# Patient Record
Sex: MALE | URBAN - METROPOLITAN AREA
[De-identification: names, ages, dates, MRNs, and addresses within clinical notes are randomized per-mention and may not be internally consistent; named-entity substitution may affect disease eponyms.]

---

## 2019-11-27 PROBLEM — Z00.00 ENCOUNTER FOR PREVENTIVE HEALTH EXAMINATION: Status: ACTIVE | Noted: 2019-11-27

## 2020-03-24 ENCOUNTER — INPATIENT (INPATIENT)
Facility: HOSPITAL | Age: 50
LOS: 5 days | Discharge: HOME | DRG: 193 | End: 2020-03-30
Attending: STUDENT IN AN ORGANIZED HEALTH CARE EDUCATION/TRAINING PROGRAM | Admitting: INTERNAL MEDICINE
Payer: SELF-PAY

## 2020-03-24 VITALS
HEART RATE: 113 BPM | WEIGHT: 250 LBS | TEMPERATURE: 100 F | OXYGEN SATURATION: 97 % | DIASTOLIC BLOOD PRESSURE: 67 MMHG | SYSTOLIC BLOOD PRESSURE: 118 MMHG | HEIGHT: 73 IN | RESPIRATION RATE: 20 BRPM

## 2020-03-24 DIAGNOSIS — R68.89 OTHER GENERAL SYMPTOMS AND SIGNS: ICD-10-CM

## 2020-03-24 LAB
ALBUMIN SERPL ELPH-MCNC: 3.5 G/DL — SIGNIFICANT CHANGE UP (ref 3.3–5)
ALP SERPL-CCNC: 65 U/L — SIGNIFICANT CHANGE UP (ref 40–120)
ALT FLD-CCNC: 27 U/L — SIGNIFICANT CHANGE UP (ref 10–45)
ANION GAP SERPL CALC-SCNC: 15 MMOL/L — SIGNIFICANT CHANGE UP (ref 5–17)
APTT BLD: 27.9 SEC — SIGNIFICANT CHANGE UP (ref 27.5–36.3)
AST SERPL-CCNC: 33 U/L — SIGNIFICANT CHANGE UP (ref 10–40)
BASOPHILS # BLD AUTO: 0.02 K/UL — SIGNIFICANT CHANGE UP (ref 0–0.2)
BASOPHILS NFR BLD AUTO: 0.3 % — SIGNIFICANT CHANGE UP (ref 0–2)
BILIRUB SERPL-MCNC: 0.2 MG/DL — SIGNIFICANT CHANGE UP (ref 0.2–1.2)
BUN SERPL-MCNC: 18 MG/DL — SIGNIFICANT CHANGE UP (ref 7–23)
CALCIUM SERPL-MCNC: 8.6 MG/DL — SIGNIFICANT CHANGE UP (ref 8.4–10.5)
CHLORIDE SERPL-SCNC: 98 MMOL/L — SIGNIFICANT CHANGE UP (ref 96–108)
CO2 SERPL-SCNC: 20 MMOL/L — LOW (ref 22–31)
CREAT SERPL-MCNC: 0.75 MG/DL — SIGNIFICANT CHANGE UP (ref 0.5–1.3)
EOSINOPHIL # BLD AUTO: 0.15 K/UL — SIGNIFICANT CHANGE UP (ref 0–0.5)
EOSINOPHIL NFR BLD AUTO: 2.4 % — SIGNIFICANT CHANGE UP (ref 0–6)
GLUCOSE SERPL-MCNC: 120 MG/DL — HIGH (ref 70–99)
HCT VFR BLD CALC: 44.7 % — SIGNIFICANT CHANGE UP (ref 39–50)
HGB BLD-MCNC: 14.3 G/DL — SIGNIFICANT CHANGE UP (ref 13–17)
IMM GRANULOCYTES NFR BLD AUTO: 0.5 % — SIGNIFICANT CHANGE UP (ref 0–1.5)
INR BLD: 1.06 RATIO — SIGNIFICANT CHANGE UP (ref 0.88–1.16)
LACTATE BLDV-MCNC: 1.5 MMOL/L — SIGNIFICANT CHANGE UP (ref 0.7–2)
LYMPHOCYTES # BLD AUTO: 1.46 K/UL — SIGNIFICANT CHANGE UP (ref 1–3.3)
LYMPHOCYTES # BLD AUTO: 23.3 % — SIGNIFICANT CHANGE UP (ref 13–44)
MCHC RBC-ENTMCNC: 28.6 PG — SIGNIFICANT CHANGE UP (ref 27–34)
MCHC RBC-ENTMCNC: 32 GM/DL — SIGNIFICANT CHANGE UP (ref 32–36)
MCV RBC AUTO: 89.4 FL — SIGNIFICANT CHANGE UP (ref 80–100)
MONOCYTES # BLD AUTO: 0.41 K/UL — SIGNIFICANT CHANGE UP (ref 0–0.9)
MONOCYTES NFR BLD AUTO: 6.5 % — SIGNIFICANT CHANGE UP (ref 2–14)
NEUTROPHILS # BLD AUTO: 4.19 K/UL — SIGNIFICANT CHANGE UP (ref 1.8–7.4)
NEUTROPHILS NFR BLD AUTO: 67 % — SIGNIFICANT CHANGE UP (ref 43–77)
NRBC # BLD: 0 /100 WBCS — SIGNIFICANT CHANGE UP (ref 0–0)
PLATELET # BLD AUTO: 196 K/UL — SIGNIFICANT CHANGE UP (ref 150–400)
POTASSIUM SERPL-MCNC: 4.7 MMOL/L — SIGNIFICANT CHANGE UP (ref 3.5–5.3)
POTASSIUM SERPL-SCNC: 4.7 MMOL/L — SIGNIFICANT CHANGE UP (ref 3.5–5.3)
PROT SERPL-MCNC: 7.2 G/DL — SIGNIFICANT CHANGE UP (ref 6–8.3)
PROTHROM AB SERPL-ACNC: 12.2 SEC — SIGNIFICANT CHANGE UP (ref 10–12.9)
RBC # BLD: 5 M/UL — SIGNIFICANT CHANGE UP (ref 4.2–5.8)
RBC # FLD: 13.1 % — SIGNIFICANT CHANGE UP (ref 10.3–14.5)
SODIUM SERPL-SCNC: 133 MMOL/L — LOW (ref 135–145)
WBC # BLD: 6.26 K/UL — SIGNIFICANT CHANGE UP (ref 3.8–10.5)
WBC # FLD AUTO: 6.26 K/UL — SIGNIFICANT CHANGE UP (ref 3.8–10.5)

## 2020-03-24 PROCEDURE — 99285 EMERGENCY DEPT VISIT HI MDM: CPT

## 2020-03-24 PROCEDURE — 71045 X-RAY EXAM CHEST 1 VIEW: CPT | Mod: 26

## 2020-03-24 PROCEDURE — 99223 1ST HOSP IP/OBS HIGH 75: CPT

## 2020-03-24 RX ORDER — SODIUM CHLORIDE 9 MG/ML
1000 INJECTION INTRAMUSCULAR; INTRAVENOUS; SUBCUTANEOUS ONCE
Refills: 0 | Status: COMPLETED | OUTPATIENT
Start: 2020-03-24 | End: 2020-03-24

## 2020-03-24 RX ORDER — ACETAMINOPHEN 500 MG
975 TABLET ORAL ONCE
Refills: 0 | Status: COMPLETED | OUTPATIENT
Start: 2020-03-24 | End: 2020-03-24

## 2020-03-24 RX ADMIN — SODIUM CHLORIDE 1000 MILLILITER(S): 9 INJECTION INTRAMUSCULAR; INTRAVENOUS; SUBCUTANEOUS at 22:06

## 2020-03-24 RX ADMIN — Medication 975 MILLIGRAM(S): at 22:05

## 2020-03-24 NOTE — ED ADULT NURSE NOTE - OBJECTIVE STATEMENT
49 year old A&Ox3 male presents to ED complaining of 49 year old A&Ox3 male presents to ED complaining of cough and fever x 5 days. PMH SAH. +SOB at rest, ERICKSON, mild labored breathing, placed on 3L NC with improvement to 97%, faint rales heard in bilateral bases. Pt febrile and tachycardic upon arrival to [facility]. Akbar Huang aware. Denies CP,  n/v/d, abdominal pain, urinary symptoms,  numbness, tingling in upper and lower extremities, HA, blurry vision. Updated on plan of care.

## 2020-03-24 NOTE — ED PROVIDER NOTE - ATTENDING CONTRIBUTION TO CARE
Attending MD Jimenez:   I personally have seen and examined this patient.  Physician assistant note reviewed and agree on plan of care and except where noted.  See below for details.     Seen in [id number]    49M with no reported PMH/PSH presents to the ED with     TO BE COMPLETED Attending MD Jimenez:   I personally have seen and examined this patient.  Physician assistant note reviewed and agree on plan of care and except where noted.  See below for details.     Seen in [id number]    49M with PMD of SAH about 10 yrs ago, reports no aneurysm presents to the ED with cough, fever.  Reports symptoms started on Friday, that day oral Tmax was 102.  Reports that he felt his head spinning, reports almost fell down.  Reports had "typical flu crap".  Reports had a dry cough, reports took Mucinex but denies expectorating.  Reports shortness of breath, worse with laying down, improved with sitting up and not speaking.  Denies chest pain.  Denies hemoptysis, denies travel, denies history of clots.  Denies known covid contacts.  Reports initially had mild sore throat that has now resolved.  Denies abdominal pain, nausea, vomiting, diarrhea, blood in stools. Denies dysuria, hematuria, change in urinary habits including frequency, urgency. Denies meds, denies allergies. On exam, head NCAT, PERRL, FROM at neck, no tenderness to midline palpation, no stepoffs along length of spine, lungs CTAB with good inspiratory effort, no wheezing, no rhonchi, no rales, Sat'ing in low 90s with speaking with visible increased work of breathing, no retractions, no accessory muscle use, +S1S2, tachy, no m/r/g, abdomen soft with +BS, NT, ND, moving all extremities with 5/5 strength bilateral upper and lower extremities, good and equal  strength bilaterally, no calf tenderness, swelling, erythema or warmth; A/P: 49M with cough, fever, Ddx includes influenza, URI, possibly COVID-19, PNA, will obtain labs, swab, Cx, placed on O2 in the room, improved to mid 90s on 3L, still drops to 92% with speaking, will need admission for hypoxia

## 2020-03-24 NOTE — ED PROVIDER NOTE - PHYSICAL EXAMINATION
GEN: Pt in NAD, A&O x3.  PSYCH: Affect appropriate.  EYES: Sclera white w/o injection.   ENT: Head NCAT. Nose w/o deformity. No auricular TTP. MMM, uvula midline, no trismus, no dysphonia, no drooling. Neck supple FROM.   RESP: Comfortable on nasal cannula, SpO2 91-92% on RA at rest with mild dyspnea, inspiratory rales b/l more prominent in middle and upper lung fields. no wheezing or rhonchi.   CARDIAC: RRR, clear distinct S1, S2, no appreciable murmurs.  ABD: Abdomen soft, non-tender. No CVAT b/l.  VASC: 2+ radial and dorsalis pedis pulses b/l. No edema or calf tenderness.  SKIN: No rashes on the trunk.

## 2020-03-24 NOTE — ED ADULT NURSE NOTE - NSIMPLEMENTINTERV_GEN_ALL_ED
Implemented All Universal Safety Interventions:  [place] to call system. Call bell, personal items and telephone within reach. Instruct patient to call for assistance. Room bathroom lighting operational. Non-slip footwear when patient is off stretcher. Physically safe environment: no spills, clutter or unnecessary equipment. Stretcher in lowest position, wheels locked, appropriate side rails in place.

## 2020-03-24 NOTE — ED PROVIDER NOTE - PROGRESS NOTE DETAILS
This patient is a non [facility] employee and seen in the Emergency department.   Staff did use appropriate PPE.

## 2020-03-25 DIAGNOSIS — J96.01 ACUTE RESPIRATORY FAILURE WITH HYPOXIA: ICD-10-CM

## 2020-03-25 DIAGNOSIS — R09.89 OTHER SPECIFIED SYMPTOMS AND SIGNS INVOLVING THE CIRCULATORY AND RESPIRATORY SYSTEMS: ICD-10-CM

## 2020-03-25 DIAGNOSIS — R68.89 OTHER GENERAL SYMPTOMS AND SIGNS: ICD-10-CM

## 2020-03-25 DIAGNOSIS — I60.9 NONTRAUMATIC SUBARACHNOID HEMORRHAGE, UNSPECIFIED: ICD-10-CM

## 2020-03-25 DIAGNOSIS — Z29.9 ENCOUNTER FOR PROPHYLACTIC MEASURES, UNSPECIFIED: ICD-10-CM

## 2020-03-25 LAB
ANION GAP SERPL CALC-SCNC: 13 MMOL/L — SIGNIFICANT CHANGE UP (ref 5–17)
APPEARANCE UR: CLEAR — SIGNIFICANT CHANGE UP
APTT BLD: 28.2 SEC — SIGNIFICANT CHANGE UP (ref 27.5–36.3)
BASOPHILS # BLD AUTO: 0.02 K/UL — SIGNIFICANT CHANGE UP (ref 0–0.2)
BASOPHILS NFR BLD AUTO: 0.4 % — SIGNIFICANT CHANGE UP (ref 0–2)
BILIRUB UR-MCNC: NEGATIVE — SIGNIFICANT CHANGE UP
BUN SERPL-MCNC: 12 MG/DL — SIGNIFICANT CHANGE UP (ref 7–23)
CALCIUM SERPL-MCNC: 8.8 MG/DL — SIGNIFICANT CHANGE UP (ref 8.4–10.5)
CHLORIDE SERPL-SCNC: 100 MMOL/L — SIGNIFICANT CHANGE UP (ref 96–108)
CO2 SERPL-SCNC: 23 MMOL/L — SIGNIFICANT CHANGE UP (ref 22–31)
COLOR SPEC: SIGNIFICANT CHANGE UP
CREAT SERPL-MCNC: 0.75 MG/DL — SIGNIFICANT CHANGE UP (ref 0.5–1.3)
CRP SERPL-MCNC: 8.55 MG/DL — HIGH (ref 0–0.4)
CULTURE RESULTS: NO GROWTH — SIGNIFICANT CHANGE UP
D DIMER BLD IA.RAPID-MCNC: 431 NG/ML DDU — HIGH
DIFF PNL FLD: NEGATIVE — SIGNIFICANT CHANGE UP
EOSINOPHIL # BLD AUTO: 0 K/UL — SIGNIFICANT CHANGE UP (ref 0–0.5)
EOSINOPHIL NFR BLD AUTO: 0 % — SIGNIFICANT CHANGE UP (ref 0–6)
FERRITIN SERPL-MCNC: 680 NG/ML — HIGH (ref 30–400)
GLUCOSE SERPL-MCNC: 108 MG/DL — HIGH (ref 70–99)
GLUCOSE UR QL: NEGATIVE — SIGNIFICANT CHANGE UP
HCT VFR BLD CALC: 44.7 % — SIGNIFICANT CHANGE UP (ref 39–50)
HGB BLD-MCNC: 14 G/DL — SIGNIFICANT CHANGE UP (ref 13–17)
IMM GRANULOCYTES NFR BLD AUTO: 0.4 % — SIGNIFICANT CHANGE UP (ref 0–1.5)
INR BLD: 1.01 RATIO — SIGNIFICANT CHANGE UP (ref 0.88–1.16)
KETONES UR-MCNC: NEGATIVE — SIGNIFICANT CHANGE UP
LEUKOCYTE ESTERASE UR-ACNC: NEGATIVE — SIGNIFICANT CHANGE UP
LYMPHOCYTES # BLD AUTO: 1.84 K/UL — SIGNIFICANT CHANGE UP (ref 1–3.3)
LYMPHOCYTES # BLD AUTO: 34.3 % — SIGNIFICANT CHANGE UP (ref 13–44)
MAGNESIUM SERPL-MCNC: 2 MG/DL — SIGNIFICANT CHANGE UP (ref 1.6–2.6)
MCHC RBC-ENTMCNC: 28.3 PG — SIGNIFICANT CHANGE UP (ref 27–34)
MCHC RBC-ENTMCNC: 31.3 GM/DL — LOW (ref 32–36)
MCV RBC AUTO: 90.3 FL — SIGNIFICANT CHANGE UP (ref 80–100)
MONOCYTES # BLD AUTO: 0.4 K/UL — SIGNIFICANT CHANGE UP (ref 0–0.9)
MONOCYTES NFR BLD AUTO: 7.5 % — SIGNIFICANT CHANGE UP (ref 2–14)
NEUTROPHILS # BLD AUTO: 3.08 K/UL — SIGNIFICANT CHANGE UP (ref 1.8–7.4)
NEUTROPHILS NFR BLD AUTO: 57.4 % — SIGNIFICANT CHANGE UP (ref 43–77)
NITRITE UR-MCNC: NEGATIVE — SIGNIFICANT CHANGE UP
NRBC # BLD: 0 /100 WBCS — SIGNIFICANT CHANGE UP (ref 0–0)
PH UR: 6.5 — SIGNIFICANT CHANGE UP (ref 5–8)
PLATELET # BLD AUTO: 218 K/UL — SIGNIFICANT CHANGE UP (ref 150–400)
POTASSIUM SERPL-MCNC: 5.1 MMOL/L — SIGNIFICANT CHANGE UP (ref 3.5–5.3)
POTASSIUM SERPL-SCNC: 5.1 MMOL/L — SIGNIFICANT CHANGE UP (ref 3.5–5.3)
PROCALCITONIN SERPL-MCNC: 0.08 NG/ML — SIGNIFICANT CHANGE UP (ref 0.02–0.1)
PROT UR-MCNC: SIGNIFICANT CHANGE UP
PROTHROM AB SERPL-ACNC: 11.6 SEC — SIGNIFICANT CHANGE UP (ref 10–13.1)
RBC # BLD: 4.95 M/UL — SIGNIFICANT CHANGE UP (ref 4.2–5.8)
RBC # FLD: 13.2 % — SIGNIFICANT CHANGE UP (ref 10.3–14.5)
SARS-COV-2 RNA SPEC QL NAA+PROBE: DETECTED
SODIUM SERPL-SCNC: 136 MMOL/L — SIGNIFICANT CHANGE UP (ref 135–145)
SP GR SPEC: 1.02 — SIGNIFICANT CHANGE UP (ref 1.01–1.02)
SPECIMEN SOURCE: SIGNIFICANT CHANGE UP
UROBILINOGEN FLD QL: NEGATIVE — SIGNIFICANT CHANGE UP
WBC # BLD: 5.36 K/UL — SIGNIFICANT CHANGE UP (ref 3.8–10.5)
WBC # FLD AUTO: 5.36 K/UL — SIGNIFICANT CHANGE UP (ref 3.8–10.5)

## 2020-03-25 PROCEDURE — 99233 SBSQ HOSP IP/OBS HIGH 50: CPT

## 2020-03-25 RX ORDER — ALBUTEROL 90 UG/1
2 AEROSOL, METERED ORAL EVERY 6 HOURS
Refills: 0 | Status: DISCONTINUED | OUTPATIENT
Start: 2020-03-25 | End: 2020-03-30

## 2020-03-25 RX ORDER — ACETAMINOPHEN 500 MG
650 TABLET ORAL EVERY 6 HOURS
Refills: 0 | Status: DISCONTINUED | OUTPATIENT
Start: 2020-03-25 | End: 2020-03-30

## 2020-03-25 RX ORDER — HYDROXYCHLOROQUINE SULFATE 200 MG
200 TABLET ORAL EVERY 12 HOURS
Refills: 0 | Status: COMPLETED | OUTPATIENT
Start: 2020-03-26 | End: 2020-03-29

## 2020-03-25 RX ORDER — METOCLOPRAMIDE HCL 10 MG
10 TABLET ORAL ONCE
Refills: 0 | Status: COMPLETED | OUTPATIENT
Start: 2020-03-25 | End: 2020-03-25

## 2020-03-25 RX ORDER — HYDROXYCHLOROQUINE SULFATE 200 MG
400 TABLET ORAL EVERY 12 HOURS
Refills: 0 | Status: COMPLETED | OUTPATIENT
Start: 2020-03-25 | End: 2020-03-25

## 2020-03-25 RX ORDER — ENOXAPARIN SODIUM 100 MG/ML
40 INJECTION SUBCUTANEOUS DAILY
Refills: 0 | Status: DISCONTINUED | OUTPATIENT
Start: 2020-03-25 | End: 2020-03-30

## 2020-03-25 RX ORDER — INFLUENZA VIRUS VACCINE 15; 15; 15; 15 UG/.5ML; UG/.5ML; UG/.5ML; UG/.5ML
0.5 SUSPENSION INTRAMUSCULAR ONCE
Refills: 0 | Status: DISCONTINUED | OUTPATIENT
Start: 2020-03-25 | End: 2020-03-30

## 2020-03-25 RX ORDER — HYDROXYCHLOROQUINE SULFATE 200 MG
TABLET ORAL
Refills: 0 | Status: COMPLETED | OUTPATIENT
Start: 2020-03-25 | End: 2020-03-29

## 2020-03-25 RX ADMIN — Medication 650 MILLIGRAM(S): at 03:39

## 2020-03-25 RX ADMIN — Medication 650 MILLIGRAM(S): at 12:39

## 2020-03-25 RX ADMIN — ENOXAPARIN SODIUM 40 MILLIGRAM(S): 100 INJECTION SUBCUTANEOUS at 08:00

## 2020-03-25 RX ADMIN — Medication 650 MILLIGRAM(S): at 18:58

## 2020-03-25 RX ADMIN — Medication 10 MILLIGRAM(S): at 08:00

## 2020-03-25 RX ADMIN — ALBUTEROL 2 PUFF(S): 90 AEROSOL, METERED ORAL at 17:05

## 2020-03-25 RX ADMIN — Medication 400 MILLIGRAM(S): at 17:04

## 2020-03-25 RX ADMIN — Medication 650 MILLIGRAM(S): at 21:18

## 2020-03-25 RX ADMIN — Medication 650 MILLIGRAM(S): at 11:52

## 2020-03-25 RX ADMIN — Medication 400 MILLIGRAM(S): at 05:42

## 2020-03-25 NOTE — H&P ADULT - NSHPSOCIALHISTORY_GEN_ALL_CORE
Denies smoking or ETOH Denies smoking or ETOH. lives with wife and daughter. No recent travel. Works as . [employer]

## 2020-03-25 NOTE — H&P ADULT - NSHPPHYSICALEXAM_GEN_ALL_CORE
Vital Signs Last 24 Hrs  T(C): 37.4 (03-25-20 @ 00:09), Max: 39.2 (03-24-20 @ 22:14)  T(F): 99.3 (03-25-20 @ 00:09), Max: 102.6 (03-24-20 @ 22:14)  HR: 92 (03-25-20 @ 00:09) (92 - 113)  BP: 125/88 (03-25-20 @ 00:09) (118/67 - 125/88)  BP(mean): --  RR: 20 (03-25-20 @ 00:09) (20 - 22)  SpO2: 97% (03-25-20 @ 00:09) (97% - 97%)

## 2020-03-25 NOTE — H&P ADULT - ASSESSMENT
49M w/ hx of SAH 10 years ago and no other PMhx p/w SOB now with acute hypoxic respiratory failure likely due to viral pneumonia suspicious for COVID

## 2020-03-25 NOTE — H&P ADULT - PROBLEM SELECTOR PLAN 2
Patchy opacities and presentation highly suspicious  - COVID19 PCR collected and is pending, would repeat test if negative  - Chest imaging consistent with viral PNA  - Continue with supplemental oxygen with goal SpO2>92, if requires escalate to BiPAP and avoid BiPAP/High Flow Nasal Cannula per institution policy given risk of aerosolization  - F/u procalcitonin, if elevated would start IV antibiotics  - obtain cbc with diff, CMP w/ lytes, coag, D-dimer, procalcitonin, ESR, CRP, LDH, Ferritin, Lactate, G6PD  - monitor EKG for QTc prolongation  - isolation precautions  - Would consider starting hydroxychloroquine if COVID positive Patchy opacities and presentation highly suspicious  - COVID19 PCR collected and is pending, would repeat test if negative  - Chest imaging consistent with viral PNA  - Continue with supplemental oxygen with goal SpO2>92, if requires escalate to BiPAP and avoid BiPAP/High Flow Nasal Cannula per institution policy given risk of aerosolization  - F/u procalcitonin, if elevated would start IV antibiotics  - obtain cbc with diff, CMP w/ lytes, coag, D-dimer, procalcitonin, ESR, CRP, LDH, Ferritin, Lactate, G6PD  - monitor EKG for QTc prolongation  - isolation precautions  - Would consider starting hydroxychloroquine if COVID positive  - F/u BCx

## 2020-03-25 NOTE — H&P ADULT - HISTORY OF PRESENT ILLNESS
Late Entry, Pt seen on 3/24/20 at 11:30pm Late Entry, Pt seen on 3/24/20 at 11:30pm  49M w/ hx of SAH 10 years ago and no other PMhx p/w SOB. Pt states he has been having headaches and sinus congestion for past 5 days. Has also noticed gradual dry cough. Today started to feel really SOB with ERICKSON. Came to hospital for further evaluation. feels like he has flu. Denies any other sick contacts and lives with wife and daughter. Reportedly they feel okay. Denies fevers or mylagias at home.    In ER: Given tylenol

## 2020-03-25 NOTE — PROGRESS NOTE ADULT - SUBJECTIVE AND OBJECTIVE BOX
Patient is a 49y old  Male who presents with a chief complaint of Shortness of breath (25 Mar 2020 00:09)      SUBJECTIVE / OVERNIGHT EVENTS: still feels short of breath, no cp, no abdominal pain, cough and dyspnea is worse when he lies to the side     MEDICATIONS  (STANDING):  enoxaparin Injectable 40 milliGRAM(s) SubCutaneous daily  hydroxychloroquine   Oral   hydroxychloroquine 400 milliGRAM(s) Oral every 12 hours  influenza   Vaccine 0.5 milliLiter(s) IntraMuscular once    MEDICATIONS  (PRN):  acetaminophen   Tablet .. 650 milliGRAM(s) Oral every 6 hours PRN Temp greater or equal to 38C (100.4F), Mild Pain (1 - 3), Moderate Pain (4 - 6)        CAPILLARY BLOOD GLUCOSE        I&O's Summary    24 Mar 2020 07:  -  25 Mar 2020 07:00  --------------------------------------------------------  IN: 240 mL / OUT: 400 mL / NET: -160 mL    25 Mar 2020 07:01  -  25 Mar 2020 12:07  --------------------------------------------------------  IN: 500 mL / OUT: 800 mL / NET: -300 mL        PHYSICAL EXAM:  GENERAL: NAD, well-developed  HEAD:  Atraumatic, Normocephalic  EYES: conjunctiva and sclera clear  NECK:  No JVD  CHEST/LUNG: decreased breath sounds bases, crackels bases   HEART: Regular rate and rhythm; S1S2  ABDOMEN: Soft, Nontender, Nondistended; Bowel sounds present  EXTREMITIES: No clubbing, cyanosis, or edema  PSYCH: AAOx3  NEUROLOGY: non-focal  SKIN: No rashes or lesions    LABS:                        14.0   5.36  )-----------( 218      ( 25 Mar 2020 07:32 )             44.7         136  |  100  |  12  ----------------------------<  108<H>  5.1   |  23  |  0.75    Ca    8.8      25 Mar 2020 07:28  Mg     2.0     03-25    TPro  7.2  /  Alb  3.5  /  TBili  0.2  /  DBili  x   /  AST  33  /  ALT  27  /  AlkPhos  65  03-24    PT/INR - ( 25 Mar 2020 08:39 )   PT: 11.6 sec;   INR: 1.01 ratio         PTT - ( 25 Mar 2020 08:39 )  PTT:28.2 sec      Urinalysis Basic - ( 25 Mar 2020 00:26 )    Color: Light Yellow / Appearance: Clear / S.016 / pH: x  Gluc: x / Ketone: Negative  / Bili: Negative / Urobili: Negative   Blood: x / Protein: Trace / Nitrite: Negative   Leuk Esterase: Negative / RBC: x / WBC x   Sq Epi: x / Non Sq Epi: x / Bacteria: x        RADIOLOGY & ADDITIONAL TESTS:    Imaging Personally Reviewed:    Consultant(s) Notes Reviewed:      Care Discussed with Consultants/Other Providers:

## 2020-03-25 NOTE — PROGRESS NOTE ADULT - PROBLEM SELECTOR PLAN 1
-Hypoxic on RA and requiring 4L NC. SOB when talking.  -Sating 90% on 4l nc will increase to 5l nc -- sating 96%  -Cont. supplemental 02  -management as below

## 2020-03-26 LAB
ANION GAP SERPL CALC-SCNC: 12 MMOL/L — SIGNIFICANT CHANGE UP (ref 5–17)
BASOPHILS # BLD AUTO: 0.02 K/UL — SIGNIFICANT CHANGE UP (ref 0–0.2)
BASOPHILS NFR BLD AUTO: 0.4 % — SIGNIFICANT CHANGE UP (ref 0–2)
BUN SERPL-MCNC: 12 MG/DL — SIGNIFICANT CHANGE UP (ref 7–23)
CALCIUM SERPL-MCNC: 8.7 MG/DL — SIGNIFICANT CHANGE UP (ref 8.4–10.5)
CHLORIDE SERPL-SCNC: 95 MMOL/L — LOW (ref 96–108)
CO2 SERPL-SCNC: 26 MMOL/L — SIGNIFICANT CHANGE UP (ref 22–31)
CREAT SERPL-MCNC: 0.83 MG/DL — SIGNIFICANT CHANGE UP (ref 0.5–1.3)
CRP SERPL-MCNC: 8.45 MG/DL — HIGH (ref 0–0.4)
EOSINOPHIL # BLD AUTO: 0 K/UL — SIGNIFICANT CHANGE UP (ref 0–0.5)
EOSINOPHIL NFR BLD AUTO: 0 % — SIGNIFICANT CHANGE UP (ref 0–6)
ERYTHROCYTE [SEDIMENTATION RATE] IN BLOOD: 61 MM/HR — HIGH (ref 0–20)
FERRITIN SERPL-MCNC: 742 NG/ML — HIGH (ref 30–400)
GLUCOSE SERPL-MCNC: 104 MG/DL — HIGH (ref 70–99)
HCT VFR BLD CALC: 44.2 % — SIGNIFICANT CHANGE UP (ref 39–50)
HGB BLD-MCNC: 13.9 G/DL — SIGNIFICANT CHANGE UP (ref 13–17)
IMM GRANULOCYTES NFR BLD AUTO: 0.8 % — SIGNIFICANT CHANGE UP (ref 0–1.5)
LDH SERPL L TO P-CCNC: 329 U/L — HIGH (ref 50–242)
LYMPHOCYTES # BLD AUTO: 1.56 K/UL — SIGNIFICANT CHANGE UP (ref 1–3.3)
LYMPHOCYTES # BLD AUTO: 30 % — SIGNIFICANT CHANGE UP (ref 13–44)
MAGNESIUM SERPL-MCNC: 2.1 MG/DL — SIGNIFICANT CHANGE UP (ref 1.6–2.6)
MCHC RBC-ENTMCNC: 28.3 PG — SIGNIFICANT CHANGE UP (ref 27–34)
MCHC RBC-ENTMCNC: 31.4 GM/DL — LOW (ref 32–36)
MCV RBC AUTO: 90 FL — SIGNIFICANT CHANGE UP (ref 80–100)
MONOCYTES # BLD AUTO: 0.4 K/UL — SIGNIFICANT CHANGE UP (ref 0–0.9)
MONOCYTES NFR BLD AUTO: 7.7 % — SIGNIFICANT CHANGE UP (ref 2–14)
NEUTROPHILS # BLD AUTO: 3.18 K/UL — SIGNIFICANT CHANGE UP (ref 1.8–7.4)
NEUTROPHILS NFR BLD AUTO: 61.1 % — SIGNIFICANT CHANGE UP (ref 43–77)
NRBC # BLD: 0 /100 WBCS — SIGNIFICANT CHANGE UP (ref 0–0)
PHOSPHATE SERPL-MCNC: 3.7 MG/DL — SIGNIFICANT CHANGE UP (ref 2.5–4.5)
PLATELET # BLD AUTO: 237 K/UL — SIGNIFICANT CHANGE UP (ref 150–400)
POTASSIUM SERPL-MCNC: 4.5 MMOL/L — SIGNIFICANT CHANGE UP (ref 3.5–5.3)
POTASSIUM SERPL-SCNC: 4.5 MMOL/L — SIGNIFICANT CHANGE UP (ref 3.5–5.3)
RBC # BLD: 4.91 M/UL — SIGNIFICANT CHANGE UP (ref 4.2–5.8)
RBC # FLD: 13.3 % — SIGNIFICANT CHANGE UP (ref 10.3–14.5)
SODIUM SERPL-SCNC: 133 MMOL/L — LOW (ref 135–145)
WBC # BLD: 5.2 K/UL — SIGNIFICANT CHANGE UP (ref 3.8–10.5)
WBC # FLD AUTO: 5.2 K/UL — SIGNIFICANT CHANGE UP (ref 3.8–10.5)

## 2020-03-26 PROCEDURE — 93010 ELECTROCARDIOGRAM REPORT: CPT

## 2020-03-26 PROCEDURE — 99233 SBSQ HOSP IP/OBS HIGH 50: CPT

## 2020-03-26 RX ADMIN — Medication 650 MILLIGRAM(S): at 07:17

## 2020-03-26 RX ADMIN — Medication 200 MILLIGRAM(S): at 06:46

## 2020-03-26 RX ADMIN — ENOXAPARIN SODIUM 40 MILLIGRAM(S): 100 INJECTION SUBCUTANEOUS at 11:56

## 2020-03-26 RX ADMIN — Medication 200 MILLIGRAM(S): at 18:19

## 2020-03-26 RX ADMIN — Medication 650 MILLIGRAM(S): at 06:46

## 2020-03-26 RX ADMIN — Medication 650 MILLIGRAM(S): at 13:00

## 2020-03-26 RX ADMIN — Medication 650 MILLIGRAM(S): at 11:56

## 2020-03-26 NOTE — PROGRESS NOTE ADULT - SUBJECTIVE AND OBJECTIVE BOX
Patient is a 49y old  Male who presents with a chief complaint of Shortness of breath (25 Mar 2020 12:07)      SUBJECTIVE / OVERNIGHT EVENTS: Patient seen and examined at bedside. States he feels better than yesterday, has SOB on movement, is comfortable when still. No acute events overnight. Denies any CP, abd pain, diarrhea and n/v      ROS:  All other review of systems negative    Allergies    No Known Allergies    Intolerances        MEDICATIONS  (STANDING):  enoxaparin Injectable 40 milliGRAM(s) SubCutaneous daily  hydroxychloroquine   Oral   hydroxychloroquine 200 milliGRAM(s) Oral every 12 hours  influenza   Vaccine 0.5 milliLiter(s) IntraMuscular once    MEDICATIONS  (PRN):  acetaminophen   Tablet .. 650 milliGRAM(s) Oral every 6 hours PRN Temp greater or equal to 38C (100.4F), Mild Pain (1 - 3), Moderate Pain (4 - 6)  ALBUTerol    90 MICROgram(s) HFA Inhaler 2 Puff(s) Inhalation every 6 hours PRN Shortness of Breath      Vital Signs Last 24 Hrs  T(C): 36.7 (26 Mar 2020 11:53), Max: 37.6 (26 Mar 2020 05:44)  T(F): 98.1 (26 Mar 2020 11:53), Max: 99.6 (26 Mar 2020 05:44)  HR: 88 (26 Mar 2020 11:53) (84 - 97)  BP: 138/79 (26 Mar 2020 11:53) (120/78 - 138/79)  BP(mean): --  RR: 20 (26 Mar 2020 11:53) (20 - 21)  SpO2: 94% (26 Mar 2020 11:53) (92% - 95%)  CAPILLARY BLOOD GLUCOSE        I&O's Summary    25 Mar 2020 07:01  -  26 Mar 2020 07:00  --------------------------------------------------------  IN: 850 mL / OUT: 2950 mL / NET: -2100 mL    26 Mar 2020 07:01  -  26 Mar 2020 13:43  --------------------------------------------------------  IN: 440 mL / OUT: 800 mL / NET: -360 mL        PHYSICAL EXAM:  GENERAL: NAD, well-developed, 5L NC  HEAD:  Atraumatic, Normocephalic  EYES: EOMI, PERRLA, conjunctiva and sclera clear  NECK: Supple, No JVD  CHEST/LUNG: diffuse wheezing   HEART: Regular rate and rhythm; No murmurs, rubs, or gallops  ABDOMEN: Soft, Nontender, Nondistended; Bowel sounds present  EXTREMITIES:  2+ Peripheral Pulses, No clubbing, cyanosis, or edema  NEUROLOGY: AAOx3, non-focal  PSYCH: calm  SKIN: No rashes or lesions    LABS:                        13.9   5.20  )-----------( 237      ( 26 Mar 2020 06:58 )             44.2     03-26    133<L>  |  95<L>  |  12  ----------------------------<  104<H>  4.5   |  26  |  0.83    Ca    8.7      26 Mar 2020 06:58  Phos  3.7     03-26  Mg     2.1     -26    TPro  7.2  /  Alb  3.5  /  TBili  0.2  /  DBili  x   /  AST  33  /  ALT  27  /  AlkPhos  65  03-24    PT/INR - ( 25 Mar 2020 08:39 )   PT: 11.6 sec;   INR: 1.01 ratio         PTT - ( 25 Mar 2020 08:39 )  PTT:28.2 sec      Urinalysis Basic - ( 25 Mar 2020 00:26 )    Color: Light Yellow / Appearance: Clear / S.016 / pH: x  Gluc: x / Ketone: Negative  / Bili: Negative / Urobili: Negative   Blood: x / Protein: Trace / Nitrite: Negative   Leuk Esterase: Negative / RBC: x / WBC x   Sq Epi: x / Non Sq Epi: x / Bacteria: x        RADIOLOGY & ADDITIONAL TESTS:    Care Discussed with Consultants/Other Providers: d/w ID    Case Discussed with Family: Brother (MD) updated about plan

## 2020-03-26 NOTE — PROGRESS NOTE ADULT - PROBLEM SELECTOR PLAN 1
-Hypoxic on RA and requiring NC. SOB when moving side to side   -Sating 95- 96% on 5L NC, will continue for now, if become hypoxic place on NRB  -Cont. supplemental 02  -management as below

## 2020-03-27 LAB
ANION GAP SERPL CALC-SCNC: 15 MMOL/L — SIGNIFICANT CHANGE UP (ref 5–17)
BASOPHILS # BLD AUTO: 0 K/UL — SIGNIFICANT CHANGE UP (ref 0–0.2)
BASOPHILS NFR BLD AUTO: 0 % — SIGNIFICANT CHANGE UP (ref 0–2)
BUN SERPL-MCNC: 12 MG/DL — SIGNIFICANT CHANGE UP (ref 7–23)
CALCIUM SERPL-MCNC: 8.8 MG/DL — SIGNIFICANT CHANGE UP (ref 8.4–10.5)
CHLORIDE SERPL-SCNC: 99 MMOL/L — SIGNIFICANT CHANGE UP (ref 96–108)
CO2 SERPL-SCNC: 24 MMOL/L — SIGNIFICANT CHANGE UP (ref 22–31)
CREAT SERPL-MCNC: 0.7 MG/DL — SIGNIFICANT CHANGE UP (ref 0.5–1.3)
CRP SERPL-MCNC: 5.54 MG/DL — HIGH (ref 0–0.4)
EOSINOPHIL # BLD AUTO: 0 K/UL — SIGNIFICANT CHANGE UP (ref 0–0.5)
EOSINOPHIL NFR BLD AUTO: 0 % — SIGNIFICANT CHANGE UP (ref 0–6)
ERYTHROCYTE [SEDIMENTATION RATE] IN BLOOD: 59 MM/HR — HIGH (ref 0–20)
FERRITIN SERPL-MCNC: 737 NG/ML — HIGH (ref 30–400)
G6PD RBC-CCNC: 15.4 U/G HGB — SIGNIFICANT CHANGE UP (ref 7–20.5)
GLUCOSE SERPL-MCNC: 96 MG/DL — SIGNIFICANT CHANGE UP (ref 70–99)
HCT VFR BLD CALC: 44.7 % — SIGNIFICANT CHANGE UP (ref 39–50)
HGB BLD-MCNC: 14.2 G/DL — SIGNIFICANT CHANGE UP (ref 13–17)
LDH SERPL L TO P-CCNC: 371 U/L — HIGH (ref 50–242)
LYMPHOCYTES # BLD AUTO: 1.65 K/UL — SIGNIFICANT CHANGE UP (ref 1–3.3)
LYMPHOCYTES # BLD AUTO: 24.6 % — SIGNIFICANT CHANGE UP (ref 13–44)
MAGNESIUM SERPL-MCNC: 2.1 MG/DL — SIGNIFICANT CHANGE UP (ref 1.6–2.6)
MCHC RBC-ENTMCNC: 28.3 PG — SIGNIFICANT CHANGE UP (ref 27–34)
MCHC RBC-ENTMCNC: 31.8 GM/DL — LOW (ref 32–36)
MCV RBC AUTO: 89.2 FL — SIGNIFICANT CHANGE UP (ref 80–100)
MONOCYTES # BLD AUTO: 0.94 K/UL — HIGH (ref 0–0.9)
MONOCYTES NFR BLD AUTO: 14 % — SIGNIFICANT CHANGE UP (ref 2–14)
NEUTROPHILS # BLD AUTO: 4.12 K/UL — SIGNIFICANT CHANGE UP (ref 1.8–7.4)
NEUTROPHILS NFR BLD AUTO: 61.4 % — SIGNIFICANT CHANGE UP (ref 43–77)
PHOSPHATE SERPL-MCNC: 3.4 MG/DL — SIGNIFICANT CHANGE UP (ref 2.5–4.5)
PLATELET # BLD AUTO: 261 K/UL — SIGNIFICANT CHANGE UP (ref 150–400)
POTASSIUM SERPL-MCNC: 4.3 MMOL/L — SIGNIFICANT CHANGE UP (ref 3.5–5.3)
POTASSIUM SERPL-SCNC: 4.3 MMOL/L — SIGNIFICANT CHANGE UP (ref 3.5–5.3)
RBC # BLD: 5.01 M/UL — SIGNIFICANT CHANGE UP (ref 4.2–5.8)
RBC # FLD: 13.2 % — SIGNIFICANT CHANGE UP (ref 10.3–14.5)
SODIUM SERPL-SCNC: 138 MMOL/L — SIGNIFICANT CHANGE UP (ref 135–145)
WBC # BLD: 6.71 K/UL — SIGNIFICANT CHANGE UP (ref 3.8–10.5)
WBC # FLD AUTO: 6.71 K/UL — SIGNIFICANT CHANGE UP (ref 3.8–10.5)

## 2020-03-27 PROCEDURE — 99233 SBSQ HOSP IP/OBS HIGH 50: CPT

## 2020-03-27 RX ADMIN — ENOXAPARIN SODIUM 40 MILLIGRAM(S): 100 INJECTION SUBCUTANEOUS at 13:53

## 2020-03-27 RX ADMIN — Medication 200 MILLIGRAM(S): at 18:00

## 2020-03-27 RX ADMIN — Medication 200 MILLIGRAM(S): at 05:41

## 2020-03-27 NOTE — DIETITIAN INITIAL EVALUATION ADULT. - PROBLEM SELECTOR PLAN 2
Patchy opacities and presentation highly suspicious  - COVID19 PCR collected and is pending, would repeat test if negative  - Chest imaging consistent with viral PNA  - Continue with supplemental oxygen with goal SpO2>92, if requires escalate to BiPAP and avoid BiPAP/High Flow Nasal Cannula per institution policy given risk of aerosolization  - F/u procalcitonin, if elevated would start IV antibiotics  - obtain cbc with diff, CMP w/ lytes, coag, D-dimer, procalcitonin, ESR, CRP, LDH, Ferritin, Lactate, G6PD  - monitor EKG for QTc prolongation  - isolation precautions  - Would consider starting hydroxychloroquine if COVID positive  - F/u BCx

## 2020-03-27 NOTE — DIETITIAN INITIAL EVALUATION ADULT. - PHYSICAL APPEARANCE
other (specify) Ht: 6'1" Wt: 250 pounds  BMI: 33 kg/m2 IBW: 184 pounds (+/-10%) 136%IBW  Edema: 1+ R+L leg  Skin per nursing flowsheets: no pressure injuries noted

## 2020-03-27 NOTE — DIETITIAN INITIAL EVALUATION ADULT. - PERTINENT MEDS FT
MEDICATIONS  (STANDING):  enoxaparin Injectable 40 milliGRAM(s) SubCutaneous daily  hydroxychloroquine   Oral   hydroxychloroquine 200 milliGRAM(s) Oral every 12 hours  influenza   Vaccine 0.5 milliLiter(s) IntraMuscular once

## 2020-03-27 NOTE — PROGRESS NOTE ADULT - SUBJECTIVE AND OBJECTIVE BOX
Patient is a 49y old  Male who presents with a chief complaint of Shortness of breath (26 Mar 2020 13:43)      SUBJECTIVE / OVERNIGHT EVENTS: Patient seen and examined at bedside. He states he feels better today. Denies any CP,  SOB, abd pain and n/v.     ROS:  All other review of systems negative    Allergies    No Known Allergies    Intolerances        MEDICATIONS  (STANDING):  enoxaparin Injectable 40 milliGRAM(s) SubCutaneous daily  hydroxychloroquine   Oral   hydroxychloroquine 200 milliGRAM(s) Oral every 12 hours  influenza   Vaccine 0.5 milliLiter(s) IntraMuscular once    MEDICATIONS  (PRN):  acetaminophen   Tablet .. 650 milliGRAM(s) Oral every 6 hours PRN Temp greater or equal to 38C (100.4F), Mild Pain (1 - 3), Moderate Pain (4 - 6)  ALBUTerol    90 MICROgram(s) HFA Inhaler 2 Puff(s) Inhalation every 6 hours PRN Shortness of Breath      Vital Signs Last 24 Hrs  T(C): 37.2 (27 Mar 2020 05:32), Max: 37.6 (26 Mar 2020 18:18)  T(F): 99 (27 Mar 2020 05:32), Max: 99.7 (26 Mar 2020 18:18)  HR: 90 (27 Mar 2020 05:32) (88 - 94)  BP: 133/80 (27 Mar 2020 05:32) (124/74 - 138/79)  BP(mean): --  RR: 20 (27 Mar 2020 05:32) (20 - 20)  SpO2: 94% (27 Mar 2020 05:32) (92% - 94%)  CAPILLARY BLOOD GLUCOSE        I&O's Summary    26 Mar 2020 07:01  -  27 Mar 2020 07:00  --------------------------------------------------------  IN: 920 mL / OUT: 1600 mL / NET: -680 mL    27 Mar 2020 07:01  -  27 Mar 2020 11:25  --------------------------------------------------------  IN: 300 mL / OUT: 375 mL / NET: -75 mL        PHYSICAL EXAM:  GENERAL: NAD, well-developed  HEAD:  Atraumatic, Normocephalic  EYES: EOMI, PERRLA, conjunctiva and sclera clear  NECK: Supple, No JVD  CHEST/LUNG: Clear to auscultation bilaterally; No wheeze  HEART: Regular rate and rhythm; No murmurs, rubs, or gallops  ABDOMEN: Soft, Nontender, Nondistended; Bowel sounds present  EXTREMITIES:  2+ Peripheral Pulses, No clubbing, cyanosis, or edema  NEUROLOGY: AAOx3, non-focal  PSYCH: calm  SKIN: No rashes or lesions    LABS:                        14.2   6.71  )-----------( 261      ( 27 Mar 2020 07:20 )             44.7     03-27    138  |  99  |  12  ----------------------------<  96  4.3   |  24  |  0.70    Ca    8.8      27 Mar 2020 07:15  Phos  3.4     03-27  Mg     2.1     03-27    TPro  7.0  /  Alb  3.4  /  TBili  0.3  /  DBili  <0.1  /  AST  32  /  ALT  32  /  AlkPhos  64  03-27              RADIOLOGY & ADDITIONAL TESTS:      Care Discussed with Consultants/Other Providers: Medicine PA

## 2020-03-27 NOTE — DIETITIAN INITIAL EVALUATION ADULT. - OTHER INFO
"49M w/ hx of SAH 10 years ago and no other PMhx p/w SOB now with acute hypoxic respiratory failure likely due to viral pneumonia positive for COVID"    Unable to conduct a face to face interview or nutrition-focused physical exam due to limited contact restrictions related to Pt's medical condition and isolation precautions. verbal interview conducted over the phone. Pt notes he is eating well in-house, no factors negatively affecting PO intake; in-house notes consuming >75% at meals. Pt did not elaborate on diet PTA. Pt notes he weighs ~250 pounds; admits to recent weight gain, unable to provide specific amount in certain time frame - just admits he notices he gained weight by looking in the mirror. Pt notes he has to lose weight - noted importance of weight maintenance at this time. Denies any GI distress at this time - Last BM 3/26; No difficulties chewing/swallowing. Per H&P, NKFA, and no micronutrient supplementation PTA.

## 2020-03-27 NOTE — PROGRESS NOTE ADULT - PROBLEM SELECTOR PLAN 1
-Hypoxic on RA and requiring NC. prone position when awake  -Sating 93- 94% on 5L NC, will continue for now, if become hypoxic place on NRB  -Cont. supplemental 02  -management as below  - c/w Plaquenil LFt wnl  - incentive spirometer

## 2020-03-27 NOTE — DIETITIAN INITIAL EVALUATION ADULT. - ADD RECOMMEND
1. Recommend Regular diet - pt with no Hx of DM and glucose currently WNL. 2. Monitor PO intake/tolerance, weights, labs, hydration status, bowels, and skin integrity.

## 2020-03-28 LAB
ALBUMIN SERPL ELPH-MCNC: 3.2 G/DL — LOW (ref 3.3–5)
ALP SERPL-CCNC: 66 U/L — SIGNIFICANT CHANGE UP (ref 40–120)
ALT FLD-CCNC: 33 U/L — SIGNIFICANT CHANGE UP (ref 10–45)
ANION GAP SERPL CALC-SCNC: 13 MMOL/L — SIGNIFICANT CHANGE UP (ref 5–17)
AST SERPL-CCNC: 27 U/L — SIGNIFICANT CHANGE UP (ref 10–40)
BILIRUB SERPL-MCNC: 0.4 MG/DL — SIGNIFICANT CHANGE UP (ref 0.2–1.2)
BUN SERPL-MCNC: 11 MG/DL — SIGNIFICANT CHANGE UP (ref 7–23)
CALCIUM SERPL-MCNC: 8.9 MG/DL — SIGNIFICANT CHANGE UP (ref 8.4–10.5)
CHLORIDE SERPL-SCNC: 99 MMOL/L — SIGNIFICANT CHANGE UP (ref 96–108)
CO2 SERPL-SCNC: 26 MMOL/L — SIGNIFICANT CHANGE UP (ref 22–31)
CREAT SERPL-MCNC: 0.91 MG/DL — SIGNIFICANT CHANGE UP (ref 0.5–1.3)
CRP SERPL-MCNC: 6.32 MG/DL — HIGH (ref 0–0.4)
ERYTHROCYTE [SEDIMENTATION RATE] IN BLOOD: 76 MM/HR — HIGH (ref 0–20)
FERRITIN SERPL-MCNC: 687 NG/ML — HIGH (ref 30–400)
GLUCOSE SERPL-MCNC: 110 MG/DL — HIGH (ref 70–99)
HCT VFR BLD CALC: 41.7 % — SIGNIFICANT CHANGE UP (ref 39–50)
HGB BLD-MCNC: 13.2 G/DL — SIGNIFICANT CHANGE UP (ref 13–17)
LDH SERPL L TO P-CCNC: 370 U/L — HIGH (ref 50–242)
MAGNESIUM SERPL-MCNC: 2.2 MG/DL — SIGNIFICANT CHANGE UP (ref 1.6–2.6)
MCHC RBC-ENTMCNC: 28.2 PG — SIGNIFICANT CHANGE UP (ref 27–34)
MCHC RBC-ENTMCNC: 31.7 GM/DL — LOW (ref 32–36)
MCV RBC AUTO: 89.1 FL — SIGNIFICANT CHANGE UP (ref 80–100)
NRBC # BLD: 0 /100 WBCS — SIGNIFICANT CHANGE UP (ref 0–0)
PHOSPHATE SERPL-MCNC: 3.6 MG/DL — SIGNIFICANT CHANGE UP (ref 2.5–4.5)
PLATELET # BLD AUTO: 289 K/UL — SIGNIFICANT CHANGE UP (ref 150–400)
POTASSIUM SERPL-MCNC: 4.9 MMOL/L — SIGNIFICANT CHANGE UP (ref 3.5–5.3)
POTASSIUM SERPL-SCNC: 4.9 MMOL/L — SIGNIFICANT CHANGE UP (ref 3.5–5.3)
PROT SERPL-MCNC: 6.9 G/DL — SIGNIFICANT CHANGE UP (ref 6–8.3)
RBC # BLD: 4.68 M/UL — SIGNIFICANT CHANGE UP (ref 4.2–5.8)
RBC # FLD: 13.2 % — SIGNIFICANT CHANGE UP (ref 10.3–14.5)
SODIUM SERPL-SCNC: 138 MMOL/L — SIGNIFICANT CHANGE UP (ref 135–145)
WBC # BLD: 6.86 K/UL — SIGNIFICANT CHANGE UP (ref 3.8–10.5)
WBC # FLD AUTO: 6.86 K/UL — SIGNIFICANT CHANGE UP (ref 3.8–10.5)

## 2020-03-28 PROCEDURE — 99233 SBSQ HOSP IP/OBS HIGH 50: CPT

## 2020-03-28 PROCEDURE — 93010 ELECTROCARDIOGRAM REPORT: CPT

## 2020-03-28 RX ADMIN — Medication 650 MILLIGRAM(S): at 15:50

## 2020-03-28 RX ADMIN — ENOXAPARIN SODIUM 40 MILLIGRAM(S): 100 INJECTION SUBCUTANEOUS at 12:12

## 2020-03-28 RX ADMIN — Medication 200 MILLIGRAM(S): at 17:50

## 2020-03-28 RX ADMIN — Medication 200 MILLIGRAM(S): at 05:42

## 2020-03-28 NOTE — PROGRESS NOTE ADULT - PROBLEM SELECTOR PLAN 1
-Hypoxic on RA and requiring NC.   -Sating 95L% on 5L NC, will continue for now, if become hypoxic place on NRB  -Cont. supplemental 02  -management as below  - c/w Plaquenil   - incentive spirometer

## 2020-03-28 NOTE — PROGRESS NOTE ADULT - SUBJECTIVE AND OBJECTIVE BOX
Patient is a 49y old  Male who presents with a chief complaint of Shortness of breath (27 Mar 2020 11:25)      SUBJECTIVE / OVERNIGHT EVENTS:    MEDICATIONS  (STANDING):  enoxaparin Injectable 40 milliGRAM(s) SubCutaneous daily  hydroxychloroquine   Oral   hydroxychloroquine 200 milliGRAM(s) Oral every 12 hours  influenza   Vaccine 0.5 milliLiter(s) IntraMuscular once    MEDICATIONS  (PRN):  acetaminophen   Tablet .. 650 milliGRAM(s) Oral every 6 hours PRN Temp greater or equal to 38C (100.4F), Mild Pain (1 - 3), Moderate Pain (4 - 6)  ALBUTerol    90 MICROgram(s) HFA Inhaler 2 Puff(s) Inhalation every 6 hours PRN Shortness of Breath      Vital Signs Last 24 Hrs  T(C): 37.2 (28 Mar 2020 05:39), Max: 37.4 (28 Mar 2020 00:29)  T(F): 99 (28 Mar 2020 05:39), Max: 99.3 (28 Mar 2020 00:29)  HR: 90 (28 Mar 2020 05:39) (85 - 94)  BP: 134/82 (28 Mar 2020 05:39) (122/74 - 145/72)  BP(mean): --  RR: 20 (28 Mar 2020 05:39) (20 - 20)  SpO2: 92% (28 Mar 2020 05:39) (91% - 95%)  CAPILLARY BLOOD GLUCOSE        I&O's Summary    27 Mar 2020 07:01  -  28 Mar 2020 07:00  --------------------------------------------------------  IN: 950 mL / OUT: 1075 mL / NET: -125 mL        PHYSICAL EXAM:  GENERAL: NAD, well-developed  HEAD:  Atraumatic, Normocephalic  EYES: EOMI, PERRLA, conjunctiva and sclera clear  NECK: Supple, No JVD  CHEST/LUNG: Clear to auscultation bilaterally; No wheeze  HEART: Regular rate and rhythm; No murmurs, rubs, or gallops  ABDOMEN: Soft, Nontender, Nondistended; Bowel sounds present  EXTREMITIES:  2+ Peripheral Pulses, No clubbing, cyanosis, or edema  PSYCH: AAOx3  NEUROLOGY: non-focal  SKIN: No rashes or lesions    LABS:                        13.2   6.86  )-----------( 289      ( 28 Mar 2020 07:01 )             41.7     03-28    138  |  99  |  11  ----------------------------<  110<H>  4.9   |  26  |  0.91    Ca    8.9      28 Mar 2020 07:04  Phos  3.6     03-28  Mg     2.2     03-28    TPro  6.9  /  Alb  3.2<L>  /  TBili  0.4  /  DBili  x   /  AST  27  /  ALT  33  /  AlkPhos  66  03-28              RADIOLOGY & ADDITIONAL TESTS:    Imaging Personally Reviewed:    Consultant(s) Notes Reviewed:      Care Discussed with Consultants/Other Providers: Patient is a 49y old  Male who presents with a chief complaint of Shortness of breath (27 Mar 2020 11:25)      SUBJECTIVE / OVERNIGHT EVENTS:  Pt seen and examined. No acute events overnight. Pt denies cough/sob/fever/chills. Does c/o headache ; otherwise states he feels well overall.    MEDICATIONS  (STANDING):  enoxaparin Injectable 40 milliGRAM(s) SubCutaneous daily  hydroxychloroquine   Oral   hydroxychloroquine 200 milliGRAM(s) Oral every 12 hours  influenza   Vaccine 0.5 milliLiter(s) IntraMuscular once    MEDICATIONS  (PRN):  acetaminophen   Tablet .. 650 milliGRAM(s) Oral every 6 hours PRN Temp greater or equal to 38C (100.4F), Mild Pain (1 - 3), Moderate Pain (4 - 6)  ALBUTerol    90 MICROgram(s) HFA Inhaler 2 Puff(s) Inhalation every 6 hours PRN Shortness of Breath      Vital Signs Last 24 Hrs  T(C): 37.2 (28 Mar 2020 05:39), Max: 37.4 (28 Mar 2020 00:29)  T(F): 99 (28 Mar 2020 05:39), Max: 99.3 (28 Mar 2020 00:29)  HR: 90 (28 Mar 2020 05:39) (85 - 94)  BP: 134/82 (28 Mar 2020 05:39) (122/74 - 145/72)  BP(mean): --  RR: 20 (28 Mar 2020 05:39) (20 - 20)  SpO2: 92% (28 Mar 2020 05:39) (91% - 95%)  CAPILLARY BLOOD GLUCOSE        I&O's Summary    27 Mar 2020 07:01  -  28 Mar 2020 07:00  --------------------------------------------------------  IN: 950 mL / OUT: 1075 mL / NET: -125 mL        PHYSICAL EXAM:  GENERAL: NAD, anicteric, afebrile  HEAD:  Atraumatic, Normocephalic  EYES: EOMI, PERRLA, conjunctiva and sclera clear  NECK: Supple, No JVD  CHEST/LUNG: Clear to auscultation bilaterally; No wheeze  HEART: Regular rate and rhythm; No murmurs, rubs, or gallops  ABDOMEN: Soft, Nontender, Nondistended; Bowel sounds present  EXTREMITIES:  2+ Peripheral Pulses, No clubbing, cyanosis, or edema  PSYCH: AAOx3  NEUROLOGY: non-focal  SKIN: No rashes or lesions    LABS:                        13.2   6.86  )-----------( 289      ( 28 Mar 2020 07:01 )             41.7     03-28    138  |  99  |  11  ----------------------------<  110<H>  4.9   |  26  |  0.91    Ca    8.9      28 Mar 2020 07:04  Phos  3.6     03-28  Mg     2.2     03-28    TPro  6.9  /  Alb  3.2<L>  /  TBili  0.4  /  DBili  x   /  AST  27  /  ALT  33  /  AlkPhos  66  03-28

## 2020-03-29 DIAGNOSIS — J12.9 VIRAL PNEUMONIA, UNSPECIFIED: ICD-10-CM

## 2020-03-29 LAB
ALBUMIN SERPL ELPH-MCNC: 3.4 G/DL — SIGNIFICANT CHANGE UP (ref 3.3–5)
ALP SERPL-CCNC: 70 U/L — SIGNIFICANT CHANGE UP (ref 40–120)
ALT FLD-CCNC: 40 U/L — SIGNIFICANT CHANGE UP (ref 10–45)
ANION GAP SERPL CALC-SCNC: 14 MMOL/L — SIGNIFICANT CHANGE UP (ref 5–17)
AST SERPL-CCNC: 29 U/L — SIGNIFICANT CHANGE UP (ref 10–40)
BILIRUB SERPL-MCNC: 0.4 MG/DL — SIGNIFICANT CHANGE UP (ref 0.2–1.2)
BUN SERPL-MCNC: 10 MG/DL — SIGNIFICANT CHANGE UP (ref 7–23)
CALCIUM SERPL-MCNC: 9 MG/DL — SIGNIFICANT CHANGE UP (ref 8.4–10.5)
CHLORIDE SERPL-SCNC: 98 MMOL/L — SIGNIFICANT CHANGE UP (ref 96–108)
CO2 SERPL-SCNC: 24 MMOL/L — SIGNIFICANT CHANGE UP (ref 22–31)
CREAT SERPL-MCNC: 0.74 MG/DL — SIGNIFICANT CHANGE UP (ref 0.5–1.3)
CRP SERPL-MCNC: 16.11 MG/DL — HIGH (ref 0–0.4)
GLUCOSE SERPL-MCNC: 90 MG/DL — SIGNIFICANT CHANGE UP (ref 70–99)
HCT VFR BLD CALC: 41.1 % — SIGNIFICANT CHANGE UP (ref 39–50)
HGB BLD-MCNC: 13.4 G/DL — SIGNIFICANT CHANGE UP (ref 13–17)
MAGNESIUM SERPL-MCNC: 2.3 MG/DL — SIGNIFICANT CHANGE UP (ref 1.6–2.6)
MCHC RBC-ENTMCNC: 28.9 PG — SIGNIFICANT CHANGE UP (ref 27–34)
MCHC RBC-ENTMCNC: 32.6 GM/DL — SIGNIFICANT CHANGE UP (ref 32–36)
MCV RBC AUTO: 88.6 FL — SIGNIFICANT CHANGE UP (ref 80–100)
NRBC # BLD: 0 /100 WBCS — SIGNIFICANT CHANGE UP (ref 0–0)
PHOSPHATE SERPL-MCNC: 4 MG/DL — SIGNIFICANT CHANGE UP (ref 2.5–4.5)
PLATELET # BLD AUTO: 316 K/UL — SIGNIFICANT CHANGE UP (ref 150–400)
POTASSIUM SERPL-MCNC: 4.7 MMOL/L — SIGNIFICANT CHANGE UP (ref 3.5–5.3)
POTASSIUM SERPL-SCNC: 4.7 MMOL/L — SIGNIFICANT CHANGE UP (ref 3.5–5.3)
PROT SERPL-MCNC: 7.3 G/DL — SIGNIFICANT CHANGE UP (ref 6–8.3)
RBC # BLD: 4.64 M/UL — SIGNIFICANT CHANGE UP (ref 4.2–5.8)
RBC # FLD: 13.1 % — SIGNIFICANT CHANGE UP (ref 10.3–14.5)
SODIUM SERPL-SCNC: 136 MMOL/L — SIGNIFICANT CHANGE UP (ref 135–145)
WBC # BLD: 7.66 K/UL — SIGNIFICANT CHANGE UP (ref 3.8–10.5)
WBC # FLD AUTO: 7.66 K/UL — SIGNIFICANT CHANGE UP (ref 3.8–10.5)

## 2020-03-29 PROCEDURE — 99233 SBSQ HOSP IP/OBS HIGH 50: CPT

## 2020-03-29 RX ADMIN — Medication 200 MILLIGRAM(S): at 17:23

## 2020-03-29 RX ADMIN — ENOXAPARIN SODIUM 40 MILLIGRAM(S): 100 INJECTION SUBCUTANEOUS at 12:15

## 2020-03-29 RX ADMIN — Medication 200 MILLIGRAM(S): at 05:02

## 2020-03-29 NOTE — PROGRESS NOTE ADULT - PROBLEM SELECTOR PLAN 1
- 2/2 COVID 19 PNA  - O2 sats improving 94% on 4L ; down from 5L yesterday.  -  c/w Plaquenil ( will complete course today)  - incentive spirometer

## 2020-03-29 NOTE — PROGRESS NOTE ADULT - SUBJECTIVE AND OBJECTIVE BOX
Patient is a 49y old  Male who presents with a chief complaint of Shortness of breath (28 Mar 2020 12:31)      SUBJECTIVE / OVERNIGHT EVENTS:    MEDICATIONS  (STANDING):  enoxaparin Injectable 40 milliGRAM(s) SubCutaneous daily  hydroxychloroquine   Oral   hydroxychloroquine 200 milliGRAM(s) Oral every 12 hours  influenza   Vaccine 0.5 milliLiter(s) IntraMuscular once    MEDICATIONS  (PRN):  acetaminophen   Tablet .. 650 milliGRAM(s) Oral every 6 hours PRN Temp greater or equal to 38C (100.4F), Mild Pain (1 - 3), Moderate Pain (4 - 6)  ALBUTerol    90 MICROgram(s) HFA Inhaler 2 Puff(s) Inhalation every 6 hours PRN Shortness of Breath      Vital Signs Last 24 Hrs  T(C): 36.6 (29 Mar 2020 13:10), Max: 37.8 (29 Mar 2020 05:16)  T(F): 97.9 (29 Mar 2020 13:10), Max: 100 (29 Mar 2020 05:16)  HR: 84 (29 Mar 2020 13:10) (84 - 94)  BP: 127/82 (29 Mar 2020 13:10) (123/72 - 127/82)  BP(mean): --  RR: 18 (29 Mar 2020 13:10) (18 - 19)  SpO2: 94% (29 Mar 2020 13:10) (91% - 94%)  CAPILLARY BLOOD GLUCOSE        I&O's Summary    28 Mar 2020 07:01  -  29 Mar 2020 07:00  --------------------------------------------------------  IN: 780 mL / OUT: 1900 mL / NET: -1120 mL    29 Mar 2020 07:01  -  29 Mar 2020 14:26  --------------------------------------------------------  IN: 320 mL / OUT: 950 mL / NET: -630 mL        PHYSICAL EXAM:  GENERAL: NAD, well-developed  HEAD:  Atraumatic, Normocephalic  EYES: EOMI, PERRLA, conjunctiva and sclera clear  NECK: Supple, No JVD  CHEST/LUNG: Clear to auscultation bilaterally; No wheeze  HEART: Regular rate and rhythm; No murmurs, rubs, or gallops  ABDOMEN: Soft, Nontender, Nondistended; Bowel sounds present  EXTREMITIES:  2+ Peripheral Pulses, No clubbing, cyanosis, or edema  PSYCH: AAOx3  NEUROLOGY: non-focal  SKIN: No rashes or lesions    LABS:                        13.4   7.66  )-----------( 316      ( 29 Mar 2020 10:18 )             41.1     03-29    136  |  98  |  10  ----------------------------<  90  4.7   |  24  |  0.74    Ca    9.0      29 Mar 2020 10:17  Phos  4.0     03-29  Mg     2.3     03-29    TPro  7.3  /  Alb  3.4  /  TBili  0.4  /  DBili  x   /  AST  29  /  ALT  40  /  AlkPhos  70  03-29              RADIOLOGY & ADDITIONAL TESTS:    Imaging Personally Reviewed:    Consultant(s) Notes Reviewed:      Care Discussed with Consultants/Other Providers: Patient is a 49y old  Male who presents with a chief complaint of Shortness of breath (28 Mar 2020 12:31)      SUBJECTIVE / OVERNIGHT EVENTS:  Pt seen and examined. No acute events overnight. He denies cough, sob. Eager to be discharged home.   Pt is sating 94% on 4L ; down from 5L yesterday.    MEDICATIONS  (STANDING):  enoxaparin Injectable 40 milliGRAM(s) SubCutaneous daily  hydroxychloroquine   Oral   hydroxychloroquine 200 milliGRAM(s) Oral every 12 hours  influenza   Vaccine 0.5 milliLiter(s) IntraMuscular once    MEDICATIONS  (PRN):  acetaminophen   Tablet .. 650 milliGRAM(s) Oral every 6 hours PRN Temp greater or equal to 38C (100.4F), Mild Pain (1 - 3), Moderate Pain (4 - 6)  ALBUTerol    90 MICROgram(s) HFA Inhaler 2 Puff(s) Inhalation every 6 hours PRN Shortness of Breath      Vital Signs Last 24 Hrs  T(C): 36.6 (29 Mar 2020 13:10), Max: 37.8 (29 Mar 2020 05:16)  T(F): 97.9 (29 Mar 2020 13:10), Max: 100 (29 Mar 2020 05:16)  HR: 84 (29 Mar 2020 13:10) (84 - 94)  BP: 127/82 (29 Mar 2020 13:10) (123/72 - 127/82)  BP(mean): --  RR: 18 (29 Mar 2020 13:10) (18 - 19)  SpO2: 94% (29 Mar 2020 13:10) (91% - 94%)  CAPILLARY BLOOD GLUCOSE        I&O's Summary    28 Mar 2020 07:01  -  29 Mar 2020 07:00  --------------------------------------------------------  IN: 780 mL / OUT: 1900 mL / NET: -1120 mL    29 Mar 2020 07:01  -  29 Mar 2020 14:26  --------------------------------------------------------  IN: 320 mL / OUT: 950 mL / NET: -630 mL        PHYSICAL EXAM:  GENERAL: NAD, anicteric, afebrile  HEAD:  Atraumatic, Normocephalic  EYES: EOMI, PERRLA, conjunctiva and sclera clear  NECK: Supple, No JVD  CHEST/LUNG: Clear to auscultation bilaterally; No wheeze  HEART: Regular rate and rhythm; No murmurs, rubs, or gallops  ABDOMEN: Soft, Nontender, Nondistended; Bowel sounds present  EXTREMITIES:  2+ Peripheral Pulses, No clubbing, cyanosis, or edema  PSYCH: AAOx3  NEUROLOGY: non-focal  SKIN: No rashes or lesions    LABS:                        13.4   7.66  )-----------( 316      ( 29 Mar 2020 10:18 )             41.1     03-29    136  |  98  |  10  ----------------------------<  90  4.7   |  24  |  0.74    Ca    9.0      29 Mar 2020 10:17  Phos  4.0     03-29  Mg     2.3     03-29    TPro  7.3  /  Alb  3.4  /  TBili  0.4  /  DBili  x   /  AST  29  /  ALT  40  /  AlkPhos  70  03-29              Care Discussed with Consultants/Other Providers: ID

## 2020-03-30 ENCOUNTER — TRANSCRIPTION ENCOUNTER (OUTPATIENT)
Age: 50
End: 2020-03-30

## 2020-03-30 VITALS — OXYGEN SATURATION: 92 %

## 2020-03-30 DIAGNOSIS — Z02.9 ENCOUNTER FOR ADMINISTRATIVE EXAMINATIONS, UNSPECIFIED: ICD-10-CM

## 2020-03-30 LAB
ALBUMIN SERPL ELPH-MCNC: 3.1 G/DL — LOW (ref 3.3–5)
ALP SERPL-CCNC: 73 U/L — SIGNIFICANT CHANGE UP (ref 40–120)
ALT FLD-CCNC: 47 U/L — HIGH (ref 10–45)
ANION GAP SERPL CALC-SCNC: 13 MMOL/L — SIGNIFICANT CHANGE UP (ref 5–17)
AST SERPL-CCNC: 31 U/L — SIGNIFICANT CHANGE UP (ref 10–40)
BILIRUB SERPL-MCNC: 0.3 MG/DL — SIGNIFICANT CHANGE UP (ref 0.2–1.2)
BUN SERPL-MCNC: 13 MG/DL — SIGNIFICANT CHANGE UP (ref 7–23)
CALCIUM SERPL-MCNC: 8.9 MG/DL — SIGNIFICANT CHANGE UP (ref 8.4–10.5)
CHLORIDE SERPL-SCNC: 99 MMOL/L — SIGNIFICANT CHANGE UP (ref 96–108)
CO2 SERPL-SCNC: 27 MMOL/L — SIGNIFICANT CHANGE UP (ref 22–31)
CREAT SERPL-MCNC: 0.71 MG/DL — SIGNIFICANT CHANGE UP (ref 0.5–1.3)
CRP SERPL-MCNC: 14.83 MG/DL — HIGH (ref 0–0.4)
CULTURE RESULTS: SIGNIFICANT CHANGE UP
FERRITIN SERPL-MCNC: 1200 NG/ML — HIGH (ref 30–400)
GLUCOSE SERPL-MCNC: 115 MG/DL — HIGH (ref 70–99)
HCT VFR BLD CALC: 42.1 % — SIGNIFICANT CHANGE UP (ref 39–50)
HGB BLD-MCNC: 13.5 G/DL — SIGNIFICANT CHANGE UP (ref 13–17)
MCHC RBC-ENTMCNC: 28.5 PG — SIGNIFICANT CHANGE UP (ref 27–34)
MCHC RBC-ENTMCNC: 32.1 GM/DL — SIGNIFICANT CHANGE UP (ref 32–36)
MCV RBC AUTO: 88.8 FL — SIGNIFICANT CHANGE UP (ref 80–100)
NRBC # BLD: 0 /100 WBCS — SIGNIFICANT CHANGE UP (ref 0–0)
PLATELET # BLD AUTO: 366 K/UL — SIGNIFICANT CHANGE UP (ref 150–400)
POTASSIUM SERPL-MCNC: 4.8 MMOL/L — SIGNIFICANT CHANGE UP (ref 3.5–5.3)
POTASSIUM SERPL-SCNC: 4.8 MMOL/L — SIGNIFICANT CHANGE UP (ref 3.5–5.3)
PROT SERPL-MCNC: 7.7 G/DL — SIGNIFICANT CHANGE UP (ref 6–8.3)
RBC # BLD: 4.74 M/UL — SIGNIFICANT CHANGE UP (ref 4.2–5.8)
RBC # FLD: 13 % — SIGNIFICANT CHANGE UP (ref 10.3–14.5)
SODIUM SERPL-SCNC: 139 MMOL/L — SIGNIFICANT CHANGE UP (ref 135–145)
SPECIMEN SOURCE: SIGNIFICANT CHANGE UP
WBC # BLD: 6.64 K/UL — SIGNIFICANT CHANGE UP (ref 3.8–10.5)
WBC # FLD AUTO: 6.64 K/UL — SIGNIFICANT CHANGE UP (ref 3.8–10.5)

## 2020-03-30 PROCEDURE — 82728 ASSAY OF FERRITIN: CPT

## 2020-03-30 PROCEDURE — 71045 X-RAY EXAM CHEST 1 VIEW: CPT

## 2020-03-30 PROCEDURE — 82955 ASSAY OF G6PD ENZYME: CPT

## 2020-03-30 PROCEDURE — 83605 ASSAY OF LACTIC ACID: CPT

## 2020-03-30 PROCEDURE — 87040 BLOOD CULTURE FOR BACTERIA: CPT

## 2020-03-30 PROCEDURE — 80076 HEPATIC FUNCTION PANEL: CPT

## 2020-03-30 PROCEDURE — 93005 ELECTROCARDIOGRAM TRACING: CPT

## 2020-03-30 PROCEDURE — 80053 COMPREHEN METABOLIC PANEL: CPT

## 2020-03-30 PROCEDURE — 86140 C-REACTIVE PROTEIN: CPT

## 2020-03-30 PROCEDURE — 85027 COMPLETE CBC AUTOMATED: CPT

## 2020-03-30 PROCEDURE — 85379 FIBRIN DEGRADATION QUANT: CPT

## 2020-03-30 PROCEDURE — 83735 ASSAY OF MAGNESIUM: CPT

## 2020-03-30 PROCEDURE — 85730 THROMBOPLASTIN TIME PARTIAL: CPT

## 2020-03-30 PROCEDURE — 80048 BASIC METABOLIC PNL TOTAL CA: CPT

## 2020-03-30 PROCEDURE — 99285 EMERGENCY DEPT VISIT HI MDM: CPT

## 2020-03-30 PROCEDURE — 83615 LACTATE (LD) (LDH) ENZYME: CPT

## 2020-03-30 PROCEDURE — 84145 PROCALCITONIN (PCT): CPT

## 2020-03-30 PROCEDURE — 99238 HOSP IP/OBS DSCHRG MGMT 30/<: CPT

## 2020-03-30 PROCEDURE — 87086 URINE CULTURE/COLONY COUNT: CPT

## 2020-03-30 PROCEDURE — 94640 AIRWAY INHALATION TREATMENT: CPT

## 2020-03-30 PROCEDURE — 81003 URINALYSIS AUTO W/O SCOPE: CPT

## 2020-03-30 PROCEDURE — 85652 RBC SED RATE AUTOMATED: CPT

## 2020-03-30 PROCEDURE — 85610 PROTHROMBIN TIME: CPT

## 2020-03-30 PROCEDURE — 84100 ASSAY OF PHOSPHORUS: CPT

## 2020-03-30 RX ORDER — THIAMINE MONONITRATE (VIT B1) 100 MG
100 TABLET ORAL DAILY
Refills: 0 | Status: DISCONTINUED | OUTPATIENT
Start: 2020-03-30 | End: 2020-03-30

## 2020-03-30 RX ORDER — ASCORBIC ACID 60 MG
1000 TABLET,CHEWABLE ORAL DAILY
Refills: 0 | Status: DISCONTINUED | OUTPATIENT
Start: 2020-03-30 | End: 2020-03-30

## 2020-03-30 RX ORDER — ZINC SULFATE TAB 220 MG (50 MG ZINC EQUIVALENT) 220 (50 ZN) MG
220 TAB ORAL DAILY
Refills: 0 | Status: DISCONTINUED | OUTPATIENT
Start: 2020-03-30 | End: 2020-03-30

## 2020-03-30 RX ADMIN — Medication 100 MILLIGRAM(S): at 14:33

## 2020-03-30 RX ADMIN — Medication 650 MILLIGRAM(S): at 06:32

## 2020-03-30 RX ADMIN — ENOXAPARIN SODIUM 40 MILLIGRAM(S): 100 INJECTION SUBCUTANEOUS at 14:32

## 2020-03-30 RX ADMIN — Medication 1000 MILLIGRAM(S): at 14:32

## 2020-03-30 RX ADMIN — Medication 650 MILLIGRAM(S): at 05:22

## 2020-03-30 RX ADMIN — ZINC SULFATE TAB 220 MG (50 MG ZINC EQUIVALENT) 220 MILLIGRAM(S): 220 (50 ZN) TAB at 14:33

## 2020-03-30 NOTE — PROGRESS NOTE ADULT - PROBLEM SELECTOR PLAN 5
Transitions of Care Status:  1.  Name of PCP: unknown   2.  PCP Contacted on Admission: [ ] Y    [x] N    3.  PCP contacted at Discharge: [ ] Y    [ ] N    [ ] N/A  4.  Post-Discharge Appointment Date and Location:  5.  Summary of Handoff given to PCP:

## 2020-03-30 NOTE — PROGRESS NOTE ADULT - PROBLEM SELECTOR PROBLEM 3
Subarachnoid hemorrhage

## 2020-03-30 NOTE — PROGRESS NOTE ADULT - PROBLEM SELECTOR PLAN 3
- Had procedure done 10 years ago. Denies any further issues

## 2020-03-30 NOTE — PROGRESS NOTE ADULT - PROBLEM SELECTOR PLAN 1
- 2/2 COVID 19 PNA  - O2 sats improving 94% on 4L; wean supplemental oxygen as tolerated   -  s/p Plaquenil (completed course on 3/29)   - incentive spirometer - 2/2 COVID 19 PNA  - O2 sats improving 94% on 4L; wean supplemental oxygen as tolerated; monitor POX on room air while ambulating    -  s/p Plaquenil (completed course on 3/29)   - incentive spirometer  - Vitamin C, thiamine, and zinc (3/30-4/3)

## 2020-03-30 NOTE — PROGRESS NOTE ADULT - SUBJECTIVE AND OBJECTIVE BOX
PROGRESS NOTE:   Authored by Dr. Latia Simms MD Pager [phone] [facility], [id number] LIV     Patient is a 49y old  Male who presents with a chief complaint of Shortness of breath (29 Mar 2020 14:26)      SUBJECTIVE / OVERNIGHT EVENTS: No acute events.     ADDITIONAL REVIEW OF SYSTEMS: No F/C, N/V/D, chest pain, abdominal pain, dysuria     MEDICATIONS  (STANDING):  enoxaparin Injectable 40 milliGRAM(s) SubCutaneous daily  influenza   Vaccine 0.5 milliLiter(s) IntraMuscular once    MEDICATIONS  (PRN):  acetaminophen   Tablet .. 650 milliGRAM(s) Oral every 6 hours PRN Temp greater or equal to 38C (100.4F), Mild Pain (1 - 3), Moderate Pain (4 - 6)  ALBUTerol    90 MICROgram(s) HFA Inhaler 2 Puff(s) Inhalation every 6 hours PRN Shortness of Breath      CAPILLARY BLOOD GLUCOSE        I&O's Summary    28 Mar 2020 07:01  -  29 Mar 2020 07:00  --------------------------------------------------------  IN: 780 mL / OUT: 1900 mL / NET: -1120 mL    29 Mar 2020 07:01  -  30 Mar 2020 06:59  --------------------------------------------------------  IN: 880 mL / OUT: 2950 mL / NET: -2070 mL        PHYSICAL EXAM:  Vital Signs Last 24 Hrs  T(C): 36.5 (30 Mar 2020 05:48), Max: 37.5 (29 Mar 2020 12:02)  T(F): 97.7 (30 Mar 2020 05:48), Max: 99.5 (29 Mar 2020 12:02)  HR: 83 (30 Mar 2020 05:48) (82 - 90)  BP: 126/80 (30 Mar 2020 05:48) (123/72 - 141/69)  BP(mean): --  RR: 18 (30 Mar 2020 05:48) (18 - 18)  SpO2: 94% (30 Mar 2020 05:48) (90% - 97%)    GENERAL: NAD, anicteric, afebrile  HEAD:  Atraumatic, Normocephalic  EYES: EOMI, PERRLA, conjunctiva and sclera clear  NECK: Supple, No JVD  CHEST/LUNG: Clear to auscultation bilaterally; No wheeze  HEART: Regular rate and rhythm; No murmurs, rubs, or gallops  ABDOMEN: Soft, Nontender, Nondistended; Bowel sounds present  EXTREMITIES:  2+ Peripheral Pulses, No clubbing, cyanosis, or edema  PSYCH: AAOx3  NEUROLOGY: non-focal  SKIN: No rashes or lesions    LABS:                        13.5   6.64  )-----------( 366      ( 30 Mar 2020 06:32 )             42.1                     RADIOLOGY & ADDITIONAL TESTS:  Results Reviewed:   Imaging Personally Reviewed:  Electrocardiogram Personally Reviewed:    COORDINATION OF CARE:  Care Discussed with Consultants/Other Providers [Y/N]:  Prior or Outpatient Records Reviewed [Y/N]: PROGRESS NOTE:   Authored by Dr. Latia Simms MD Pager [phone] [facility], [id number] LIA     Patient is a 49y old  Male who presents with a chief complaint of Shortness of breath (29 Mar 2020 14:26)      SUBJECTIVE / OVERNIGHT EVENTS: No acute events.     ADDITIONAL REVIEW OF SYSTEMS: No F/C, N/V/D, chest pain, abdominal pain, dysuria     MEDICATIONS  (STANDING):  enoxaparin Injectable 40 milliGRAM(s) SubCutaneous daily  influenza   Vaccine 0.5 milliLiter(s) IntraMuscular once    MEDICATIONS  (PRN):  acetaminophen   Tablet .. 650 milliGRAM(s) Oral every 6 hours PRN Temp greater or equal to 38C (100.4F), Mild Pain (1 - 3), Moderate Pain (4 - 6)  ALBUTerol    90 MICROgram(s) HFA Inhaler 2 Puff(s) Inhalation every 6 hours PRN Shortness of Breath      CAPILLARY BLOOD GLUCOSE        I&O's Summary    28 Mar 2020 07:01  -  29 Mar 2020 07:00  --------------------------------------------------------  IN: 780 mL / OUT: 1900 mL / NET: -1120 mL    29 Mar 2020 07:01  -  30 Mar 2020 06:59  --------------------------------------------------------  IN: 880 mL / OUT: 2950 mL / NET: -2070 mL        PHYSICAL EXAM:  Vital Signs Last 24 Hrs  T(C): 36.5 (30 Mar 2020 05:48), Max: 37.5 (29 Mar 2020 12:02)  T(F): 97.7 (30 Mar 2020 05:48), Max: 99.5 (29 Mar 2020 12:02)  HR: 83 (30 Mar 2020 05:48) (82 - 90)  BP: 126/80 (30 Mar 2020 05:48) (123/72 - 141/69)  BP(mean): --  RR: 18 (30 Mar 2020 05:48) (18 - 18)  SpO2: 94% (30 Mar 2020 05:48) (90% - 97%)    GENERAL: NAD, anicteric, afebrile  HEAD:  Atraumatic, Normocephalic  EYES: EOMI, PERRLA, conjunctiva and sclera clear  NECK: Supple, No JVD  CHEST/LUNG: Clear to auscultation bilaterally; No wheeze  HEART: Regular rate and rhythm; No murmurs, rubs, or gallops  ABDOMEN: Soft, Nontender, Nondistended; Bowel sounds present  EXTREMITIES:  2+ Peripheral Pulses, No clubbing, cyanosis, or edema  PSYCH: AAOx3  NEUROLOGY: non-focal  SKIN: No rashes or lesions    LABS:                        13.5   6.64  )-----------( 366      ( 30 Mar 2020 06:32 )             42.1             RADIOLOGY & ADDITIONAL TESTS:  Results Reviewed:   Imaging Personally Reviewed:  Electrocardiogram Personally Reviewed:    COORDINATION OF CARE:  Care Discussed with Consultants/Other Providers [Y/N]:  Prior or Outpatient Records Reviewed [Y/N]: PROGRESS NOTE:   Authored by Dr. Latia Simms MD Pager [phone] [facility], [id number] LIP     Patient is a 49y old  Male who presents with a chief complaint of Shortness of breath (29 Mar 2020 14:26)      SUBJECTIVE / OVERNIGHT EVENTS: No acute events. Pt denies cough or dyspnea, reports that he would like to go home.     ADDITIONAL REVIEW OF SYSTEMS: No F/C, N/V/D, chest pain, abdominal pain, dysuria     MEDICATIONS  (STANDING):  ascorbic acid 1000 milliGRAM(s) Oral daily  enoxaparin Injectable 40 milliGRAM(s) SubCutaneous daily  influenza   Vaccine 0.5 milliLiter(s) IntraMuscular once  thiamine 100 milliGRAM(s) Oral daily  zinc sulfate 220 milliGRAM(s) Oral daily    MEDICATIONS  (PRN):  acetaminophen   Tablet .. 650 milliGRAM(s) Oral every 6 hours PRN Temp greater or equal to 38C (100.4F), Mild Pain (1 - 3), Moderate Pain (4 - 6)  ALBUTerol    90 MICROgram(s) HFA Inhaler 2 Puff(s) Inhalation every 6 hours PRN Shortness of Breath      CAPILLARY BLOOD GLUCOSE        I&O's Summary    28 Mar 2020 07:01  -  29 Mar 2020 07:00  --------------------------------------------------------  IN: 780 mL / OUT: 1900 mL / NET: -1120 mL    29 Mar 2020 07:01  -  30 Mar 2020 06:59  --------------------------------------------------------  IN: 880 mL / OUT: 2950 mL / NET: -2070 mL        PHYSICAL EXAM:  Vital Signs Last 24 Hrs  T(C): 36.5 (30 Mar 2020 05:48), Max: 37.5 (29 Mar 2020 12:02)  T(F): 97.7 (30 Mar 2020 05:48), Max: 99.5 (29 Mar 2020 12:02)  HR: 83 (30 Mar 2020 05:48) (82 - 90)  BP: 126/80 (30 Mar 2020 05:48) (123/72 - 141/69)  BP(mean): --  RR: 18 (30 Mar 2020 05:48) (18 - 18)  SpO2: 94% (30 Mar 2020 05:48) (90% - 97%)    GENERAL: NAD, anicteric, afebrile  HEAD:  Atraumatic, Normocephalic  EYES: EOMI, PERRLA, conjunctiva and sclera clear  NECK: Supple, No JVD  CHEST/LUNG: Clear to auscultation bilaterally; No wheeze  HEART: Regular rate and rhythm; No murmurs, rubs, or gallops  ABDOMEN: Soft, Nontender, Nondistended; Bowel sounds present  EXTREMITIES:  2+ Peripheral Pulses, No clubbing, cyanosis, or edema  PSYCH: AAOx3  NEUROLOGY: non-focal  SKIN: No rashes or lesions    LABS:                        13.5   6.64  )-----------( 366      ( 30 Mar 2020 06:32 )             42.1     03-30    139  |  99  |  13  ----------------------------<  115<H>  4.8   |  27  |  0.71    Ca    8.9      30 Mar 2020 06:32  Phos  4.0     03-29  Mg     2.3     03-29    TPro  7.7  /  Alb  3.1<L>  /  TBili  0.3  /  DBili  x   /  AST  31  /  ALT  47<H>  /  AlkPhos  73  03-30        RADIOLOGY & ADDITIONAL TESTS:  Results Reviewed:   Imaging Personally Reviewed:  Electrocardiogram Personally Reviewed:    COORDINATION OF CARE:  Care Discussed with Consultants/Other Providers [Y/N]:  Prior or Outpatient Records Reviewed [Y/N]:

## 2020-03-30 NOTE — DISCHARGE NOTE NURSING/CASE MANAGEMENT/SOCIAL WORK - PATIENT PORTAL LINK FT
You can access the FollowMyHealth Patient Portal offered by [facility] by registering at the following website: [email or web address]. By joining [facility]’s FollowMyHealth portal, you will also be able to view your health information using other applications (apps) compatible with our system.

## 2020-03-30 NOTE — DISCHARGE NOTE PROVIDER - HOSPITAL COURSE
49M w/ hx of SAH 10 years ago and no other PMhx p/w SOB. Pt states he has been having headaches and sinus congestion for past 5 days. Has also noticed gradual dry cough. Today started to feel really SOB with ERICKSON. Came to hospital for further evaluation. feels like he has flu. Denies any other sick contacts and lives with wife and daughter. Reportedly they feel okay. Denies fevers or mylagias at home.        Patient admitted for hypoxemic respiratory secondary to COVID 19 infection pneumonia. His CXR revealed  viral PNA with patchy bilateral opacities. Patient was initially on 5L NC, titrated off onto room air. He completed a course of plaquenil for 5 days.        Patient is currently saturating > on room air and is hemodynamically stable for discharge. 49M w/ hx of SAH 10 years ago and no other PMhx p/w SOB. Pt states he has been having headaches and sinus congestion for past 5 days. Has also noticed gradual dry cough. Today started to feel really SOB with ERICKSON. Came to hospital for further evaluation. feels like he has flu. Denies any other sick contacts and lives with wife and daughter. Reportedly they feel okay. Denies fevers or mylagias at home.        Patient admitted for hypoxemic respiratory secondary to COVID 19 infection pneumonia. His CXR revealed viral PNA with patchy bilateral opacities. Patient was initially on 5L NC, titrated off onto room air. He completed a course of plaquenil for 5 days.        Patient is currently saturating > on room air and is hemodynamically stable for discharge. Will follow up with his PCP.

## 2020-03-30 NOTE — DISCHARGE NOTE PROVIDER - NSDCCPCAREPLAN_GEN_ALL_CORE_FT
PRINCIPAL DISCHARGE DIAGNOSIS  Diagnosis: Infection due to 2019 novel coronavirus  Assessment and Plan of Treatment: You were found to have POSITIVE COVID 19  CONTINUE ISOLATION as you have COVID-19   -You were given a handout of discharge reccomendations along with your discharge paperwork. please follow those instructions  -Call your primary care provider day after discharge to notify he/she  you were in hospital and notify you have COVID-19.  -Monitor your symptoms and seek medical attention if your illness is worsening PRINCIPAL DISCHARGE DIAGNOSIS  Diagnosis: Infection due to 2019 novel coronavirus  Assessment and Plan of Treatment: You tested positive for COVID-19, the novel corona virus and have been deemed stable to go home.  You must maintain Isolation Precautions per the CDC guidelines (please see attached document).  You must follow isolation guidelines for 14 days. The decision to discontinue isolation is to be made by your health care provider in accordance with [facility] guidelines. Please see a healthcare provider or return to the emergeny room if you have worsening symptoms (cough, shortness of breath). Before seeking care, call your health care provider and tell them you have COVID.

## 2020-03-30 NOTE — PROGRESS NOTE ADULT - PROBLEM SELECTOR PLAN 4
DVT PPx  -Lovenox  Diet: regular   Dispo: pending clinical improvement DVT PPx  -Lovenox  Diet: regular   Dispo: pending clinical improvement, will check POX on room air while ambulating. For discharge, no insurance thus will need meds sent to [facility]

## 2020-03-30 NOTE — DISCHARGE NOTE PROVIDER - NSFOLLOWUPCLINICS_GEN_ALL_ED_FT
[facility] - Primary Care  Primary Care  [address] [place] [address]  Phone: [phone]  Fax:   Follow Up Time:     [facility] [place]  Internal Medicine  [address] [place] [address]  Phone: [phone]  Fax: [phone]  Follow Up Time:

## 2020-03-30 NOTE — PROGRESS NOTE ADULT - ATTENDING COMMENTS
Called pt's wife Ying to give an update ; left voicemail.
Pt's wife Ying updated.
49 year old male with PMH of SAH 10 years ago admitted for COVID 19 infection     Patient is doing well and is completely asymptomatic.   Vitals are stable , Labs wnl .  completed the course of Plaquenil.   d/c planning today

## 2020-03-30 NOTE — PROGRESS NOTE ADULT - PROBLEM SELECTOR PLAN 2
-positive covid pcr   - c/w albuterol MDI q4h, technique observed: good  -Chest imaging consistent with viral PNA, patchy bilateral opacities   -ESR, CRP, LDH, Ferritin: Stable  -  EKG obtained today 3/26 : QTc 433  - isolation precautions
-positive covid pcr   - c/w albuterol MDI q4h, technique observed: good  -Chest imaging consistent with viral PNA, patchy bilateral opacities   -ESR, CRP, LDH, Ferritin: Stable  -  EKG obtained today 3/26 : QTc 433  - isolation precautions  - c/w plaquenil
- positive COVID 19 PCR  - Chest imaging consistent with viral PNA, patchy bilateral opacities   - c/w Plaquenil (s/p course completed 3/29)   - c/w albuterol MDI q4h  -ESR, CRP, LDH, Ferritin: Stable  - isolation precautions
-positive covid pcr   -Chest imaging consistent with viral PNA, patchy bilateral opacities   -trend ESR, CRP, LDH, Ferritin,  - monitor EKG for QTc prolongation  - isolation precautions  - c/w plaquenil
-positive COVID 19 pcr   - c/w albuterol MDI q4h  -Chest imaging consistent with viral PNA, patchy bilateral opacities   -ESR, CRP, LDH, Ferritin: Stable  -  EKG  3/26 : QTc 433  - repeat EKG tomorrow  - isolation precautions
- positive COVID 19 PCR  - Chest imaging consistent with viral PNA, patchy bilateral opacities   - c/w Plaquenil ( will complete course today)  - c/w albuterol MDI q4h  -ESR, CRP, LDH, Ferritin: Stable  - isolation precautions

## 2020-03-30 NOTE — PROGRESS NOTE ADULT - ASSESSMENT
49M w/ hx of SAH 10 years ago and no other PMhx p/w SOB now with acute hypoxic respiratory failure likely due to viral pneumonia positive for COVID
49M w/ hx of SAH 10 years ago and no other PMhx p/w SOB now with acute hypoxic respiratory failure likely due to viral pneumonia positive for COVID
49M w/ hx of SAH 10 years ago and no other PMhx p/w SOB now with acute hypoxic respiratory failure likely due to viral pneumonia positive for COVID.
49M w/ hx of SAH 10 years ago and no other PMhx p/w SOB now with acute hypoxic respiratory failure likely due to viral pneumonia positive for COVID

## 2020-03-30 NOTE — PROGRESS NOTE ADULT - PROBLEM SELECTOR PROBLEM 2
Suspected [facility] coronavirus infection
Suspected [facility] coronavirus infection
Pneumonia, viral
Suspected [facility] coronavirus infection
Suspected [facility] coronavirus infection
Pneumonia, viral

## 2020-05-22 ENCOUNTER — TRANSCRIPTION ENCOUNTER (OUTPATIENT)
Age: 50
End: 2020-05-22

## 2020-06-09 ENCOUNTER — TRANSCRIPTION ENCOUNTER (OUTPATIENT)
Age: 50
End: 2020-06-09

## 2023-07-18 NOTE — DISCHARGE NOTE NURSING/CASE MANAGEMENT/SOCIAL WORK - NURSING SECTION COMPLETE
Reason For Visit:   Chief Complaint   Patient presents with     Surgical Followup     Telephone [phone] DOS: 5/11/23 // Right Total Knee Arthroplasty// 8 -10 weeks        Primary MD: No Ref-Primary, Physician  Referring MD: Est     ?  No  Occupation retired.     Date of injury: No  Type of injury: No.     Date of surgery: 30 years ago  Type of surgery: scope L knee    Date of surgery: 5/11/23  Right TKA and left knee steroid injection      Smoker: No  Request smoking cessation information: No    There were no vitals taken for this visit.    Pain Assessment  Patient Currently in Pain: Yes  0-10 Pain Scale: 1  Primary Pain Location: Knee (right)    Sam is a 64 year old who is being evaluated via a billable Telephone visit.      How would you like to obtain your AVS? MyChart  If the video visit is dropped, the invitation should be resent by: Text to cell phone: [phone]  Will anyone else be joining your video visit? No        Telephone-Visit Details    Type of service:  Telephone Visit   Telephone Start Time: 11:05  Telephone End Time:11:13    Originating Location (pt. Location): Home    Distant Location (provider location):  On-site    Subjective   Patient is a 64-year-old male who is now 8 weeks status post right total knee replacement.  He lives up at [facility] and asked to have this be a virtual visit.  He is done with physical therapy.  He reached 105 degrees about 5 weeks ago and since that time has not been remeasured.  He is able to do steps in a tandem fashion without complaints and he is able to go around on the bike.  Has been doing stationary bike for about 5 weeks.    He did have some blistering around the skin.  He did send an image of the wound at this point time and there are some scabs remaining and spotty fashion across the midportion of the wound.  Each scab is quite small in size and there is no redness or warmth or drainage around it.    He did ask if he could swim in lake  water and ice that he could not swim until he had all of the scabs done and gone.    He is no longer taking any meds.  He is doing everything that he wants to be doing except getting into lake water.    I told him that we needed standing alignment films.  He will be back down in October to receive an injection in his opposite left knee prior to going to [place] for the winter.      At that time he will get a long-leg alignment film, AP.    Lorena Tariq MD  Professor Orthopedic Surgery  [facility]         Patient/Caregiver provided printed discharge information.

## 2024-06-13 NOTE — ED PROVIDER NOTE - NS ED ROS FT
[FreeTextEntry2] : RT elbow pain
Denies hemoptysis, recent leg pain/swelling, h/o DVT/PE, recent surgery, long car/plane rides, hormone use.